# Patient Record
Sex: MALE | Race: WHITE | NOT HISPANIC OR LATINO | Employment: UNEMPLOYED | URBAN - METROPOLITAN AREA
[De-identification: names, ages, dates, MRNs, and addresses within clinical notes are randomized per-mention and may not be internally consistent; named-entity substitution may affect disease eponyms.]

---

## 2021-06-02 ENCOUNTER — RECORDS - HEALTHEAST (OUTPATIENT)
Dept: ADMINISTRATIVE | Facility: CLINIC | Age: 41
End: 2021-06-02

## 2023-05-06 ENCOUNTER — HOSPITAL ENCOUNTER (EMERGENCY)
Facility: CLINIC | Age: 43
Discharge: HOME | End: 2023-05-06
Attending: PHYSICIAN ASSISTANT | Admitting: PHYSICIAN ASSISTANT
Payer: MEDICARE

## 2023-05-06 ENCOUNTER — APPOINTMENT (OUTPATIENT)
Dept: GENERAL RADIOLOGY | Facility: CLINIC | Age: 43
End: 2023-05-06
Attending: PHYSICIAN ASSISTANT
Payer: MEDICARE

## 2023-05-06 VITALS
SYSTOLIC BLOOD PRESSURE: 126 MMHG | TEMPERATURE: 97.9 F | OXYGEN SATURATION: 99 % | HEART RATE: 100 BPM | DIASTOLIC BLOOD PRESSURE: 80 MMHG | RESPIRATION RATE: 16 BRPM

## 2023-05-06 DIAGNOSIS — D64.9 ANEMIA: ICD-10-CM

## 2023-05-06 DIAGNOSIS — R07.89 CHEST WALL PAIN: ICD-10-CM

## 2023-05-06 LAB
ANION GAP SERPL CALCULATED.3IONS-SCNC: 12 MMOL/L (ref 7–15)
BASOPHILS # BLD AUTO: 0 10E3/UL (ref 0–0.2)
BASOPHILS NFR BLD AUTO: 1 %
BUN SERPL-MCNC: 7.6 MG/DL (ref 6–20)
CALCIUM SERPL-MCNC: 8.8 MG/DL (ref 8.6–10)
CHLORIDE SERPL-SCNC: 101 MMOL/L (ref 98–107)
CREAT SERPL-MCNC: 0.86 MG/DL (ref 0.67–1.17)
D DIMER PPP FEU-MCNC: <0.27 UG/ML FEU (ref 0–0.5)
DEPRECATED HCO3 PLAS-SCNC: 24 MMOL/L (ref 22–29)
EOSINOPHIL # BLD AUTO: 0 10E3/UL (ref 0–0.7)
EOSINOPHIL NFR BLD AUTO: 0 %
ERYTHROCYTE [DISTWIDTH] IN BLOOD BY AUTOMATED COUNT: 14.1 % (ref 10–15)
GFR SERPL CREATININE-BSD FRML MDRD: >90 ML/MIN/1.73M2
GLUCOSE SERPL-MCNC: 210 MG/DL (ref 70–99)
HCT VFR BLD AUTO: 38.6 % (ref 40–53)
HGB BLD-MCNC: 12.8 G/DL (ref 13.3–17.7)
IMM GRANULOCYTES # BLD: 0 10E3/UL
IMM GRANULOCYTES NFR BLD: 0 %
LYMPHOCYTES # BLD AUTO: 1.6 10E3/UL (ref 0.8–5.3)
LYMPHOCYTES NFR BLD AUTO: 20 %
MCH RBC QN AUTO: 28.9 PG (ref 26.5–33)
MCHC RBC AUTO-ENTMCNC: 33.2 G/DL (ref 31.5–36.5)
MCV RBC AUTO: 87 FL (ref 78–100)
MONOCYTES # BLD AUTO: 0.5 10E3/UL (ref 0–1.3)
MONOCYTES NFR BLD AUTO: 7 %
NEUTROPHILS # BLD AUTO: 6 10E3/UL (ref 1.6–8.3)
NEUTROPHILS NFR BLD AUTO: 72 %
NRBC # BLD AUTO: 0 10E3/UL
NRBC BLD AUTO-RTO: 0 /100
PLATELET # BLD AUTO: 343 10E3/UL (ref 150–450)
POTASSIUM SERPL-SCNC: 4 MMOL/L (ref 3.4–5.3)
RBC # BLD AUTO: 4.43 10E6/UL (ref 4.4–5.9)
SODIUM SERPL-SCNC: 137 MMOL/L (ref 136–145)
TROPONIN T SERPL HS-MCNC: 7 NG/L
WBC # BLD AUTO: 8.2 10E3/UL (ref 4–11)

## 2023-05-06 PROCEDURE — 85014 HEMATOCRIT: CPT | Performed by: PHYSICIAN ASSISTANT

## 2023-05-06 PROCEDURE — 84484 ASSAY OF TROPONIN QUANT: CPT | Performed by: PHYSICIAN ASSISTANT

## 2023-05-06 PROCEDURE — 99285 EMERGENCY DEPT VISIT HI MDM: CPT | Mod: 25

## 2023-05-06 PROCEDURE — 85379 FIBRIN DEGRADATION QUANT: CPT | Performed by: PHYSICIAN ASSISTANT

## 2023-05-06 PROCEDURE — 71046 X-RAY EXAM CHEST 2 VIEWS: CPT

## 2023-05-06 PROCEDURE — 93005 ELECTROCARDIOGRAM TRACING: CPT

## 2023-05-06 PROCEDURE — 80048 BASIC METABOLIC PNL TOTAL CA: CPT | Performed by: PHYSICIAN ASSISTANT

## 2023-05-06 PROCEDURE — 36415 COLL VENOUS BLD VENIPUNCTURE: CPT | Performed by: PHYSICIAN ASSISTANT

## 2023-05-06 ASSESSMENT — ENCOUNTER SYMPTOMS
DIAPHORESIS: 0
VOMITING: 0
FEVER: 0

## 2023-05-06 ASSESSMENT — ACTIVITIES OF DAILY LIVING (ADL): ADLS_ACUITY_SCORE: 35

## 2023-05-06 NOTE — ED PROVIDER NOTES
History     Chief Complaint:  Chest Pain     HPI   Ricky Garcia is a 43 year old male who has a history of DM type II, schizophrenia on clozapine, and  hyperlipidemia who presents with chest pain. The patient has had 4 days of chest pain, localized to the left side of his ribs. The pain is worse with palpation, positional changes, and when he sleeps on that side. The pain does not radiate to the shoulder, jaw, or back. He denies vomiting or diaphoresis. He denies shortness of breath, leg swelling, cough, hemoptysis, syncope,or fever. The pain does not worsen with exertion. It has been intermittent and he is pain free at present.  He denies family history of premature CAD. He denies history of recent surgery or hospitalization. He denies history of DVT/PE or MI. He states that his heart rate is regularly up when he is at the doctor's office because he always gets nervous. Patient vapes no other drug use. No black or bloody stools or hematuria.    Review of External Notes: I reviewed family medicine note from 1/31 [facility] system note well-controlled dyslipidemia on atorvastatin, metformin for diabetes with A1c of 7.5.  Tachycardic at that visit as well to 117.  ROS:   Review of Systems   Constitutional: Negative for diaphoresis and fever.   Cardiovascular: Positive for chest pain. Negative for leg swelling.   Gastrointestinal: Negative for vomiting.   Musculoskeletal:        No shoulder, jaw, or back pain.   All other systems reviewed and are negative.    Allergies:  No Known Allergies     Medications:    clozapine  polyethylene glycol   Metformin  Atorvastatin    Past Medical History:    Hyperlipidemia  Psychosis  DM type II  Hypertension  Obesity  Delusional disorder  Schizophrenia     Past Surgical History:    Colonoscopy     Social History:  PCP: Diane Gómez   Physical Exam     Patient Vitals for the past 24 hrs:   BP Temp Temp src Pulse Resp SpO2   05/06/23 1245 126/80 -- -- 100 -- 99 %   05/06/23  1230 (!) 141/72 -- -- 109 -- 98 %   05/06/23 1215 122/76 -- -- 103 -- 99 %   05/06/23 1203 (!) 156/90 97.9  F (36.6  C) Oral 112 16 98 %      Physical Exam  General: Awake, alert, non-toxic.  Head:  Scalp is NC/AT  Eyes:  Conjunctiva normal, PERRL  ENT:  The external nose and ears are normal.     Oropharynx clear, uvula midline.  Neck:  Normal range of motion without rigidity.  CV:  Mildly tachycardic but regular    No pathologic murmur, rubs, or gallops.  Resp:  Breath sounds are clear bilaterally    Non-labored, no retractions or accessory muscle use  Abdomen: Abdomen is soft, no distension, no tenderness, no masses  MS:  No lower extremity edema/swelling. Extremities without joint swelling or redness.  Skin:  Warm and dry, No rash or lesions noted.  Neuro: Alert and oriented.  GCS 15 Moves all extremities normal.  No facial asymmetry. Gait normal.  Psych:  Awake. Alert. Normal affect. Appropriate interactions.    Emergency Department Course   ECG  ECG results from 05/06/23   EKG 12 lead     Value    Systolic Blood Pressure     Diastolic Blood Pressure     Ventricular Rate 116    Atrial Rate 116    NH Interval 140    QRS Duration 108        QTc 453    P Axis 57    R AXIS 51    T Axis 59    Interpretation ECG      Sinus tachycardia  Incomplete right bundle branch block  Borderline ECG  When compared with ECG of 10-MAY-2010 12:00,  No significant change was found         Imaging:  Chest XR,  PA & LAT   Final Result   IMPRESSION: Lungs are clear. No effusions or pneumothorax. Heart size is normal fold, healed left sixth lateral rib fracture. No acute osseous findings.        Report per radiology    Laboratory:  Labs Ordered and Resulted from Time of ED Arrival to Time of ED Departure   BASIC METABOLIC PANEL - Abnormal       Result Value    Sodium 137      Potassium 4.0      Chloride 101      Carbon Dioxide (CO2) 24      Anion Gap 12      Urea Nitrogen 7.6      Creatinine 0.86      Calcium 8.8      Glucose 210  (*)     GFR Estimate >90     CBC WITH PLATELETS AND DIFFERENTIAL - Abnormal    WBC Count 8.2      RBC Count 4.43      Hemoglobin 12.8 (*)     Hematocrit 38.6 (*)     MCV 87      MCH 28.9      MCHC 33.2      RDW 14.1      Platelet Count 343      % Neutrophils 72      % Lymphocytes 20      % Monocytes 7      % Eosinophils 0      % Basophils 1      % Immature Granulocytes 0      NRBCs per 100 WBC 0      Absolute Neutrophils 6.0      Absolute Lymphocytes 1.6      Absolute Monocytes 0.5      Absolute Eosinophils 0.0      Absolute Basophils 0.0      Absolute Immature Granulocytes 0.0      Absolute NRBCs 0.0     TROPONIN T, HIGH SENSITIVITY - Normal    Troponin T, High Sensitivity 7     D DIMER QUANTITATIVE - Normal    D-Dimer Quantitative <0.27        Emergency Department Course & Assessments:    Interventions:  Medications - No data to display     Independent Interpretation (X-rays, CTs, rhythm strip):  Reviewed Patient's Xray and it shows no evidence of pneumothorax consolidation or mediastinal widening.  No pleural effusion seen.    Assessments:  1230 I obtained history and examined the patient.  1359 I rechecked the patient and explained findings.    Disposition:  The patient was discharged to home.     Impression & Plan    Medical Decision Makin year old male who presents with chest pain.  Patient history and records reviewed. Broad differential considered including: ACS, PE, aortic dissection, myocarditis, pericarditis, pneumothorax, pneumonia, esophageal rupture, musculoskeletal chest wall pain, referred pain from abdomen.  Patient well appearing with non-concerning vitals.  EKG without evidence of acute ischemia or arrythmia shows mild tachycardia. HS troponin is WNL w/ongoing symptoms >6 hours, and patient is a HEART score of 3 ([id number]) and I feel ACS unlikely.  D dimer negative and low risk for PE by Well's criteria and would not pursue further workup at this time.  Chest x-ray shows no evidence of  pneumonia, pneumothorax, CHF, or mediastinal widening.  No other high risk factors present to suggest aortic dissection, and feel this is very unlikely at this time.  Abdominal examination non-tender and doubt referred pain from intra-abdominal catastrophe, pancreatitis, gallbladder pathology.  Patient was initially fairly tachycardic however this improved on recheck and per the patient this is normally how he presents for medical visits due to some anxiety.  Indeed I did review past primary care note and this was true as well.  Obviously with his clozapine use myocarditis is a diagnostic consideration however his troponin is normal symptoms are clearly reproducible and given that his tachycardia is not new and there is nothing else to suggest this I think this is extremely unlikely.  He does also have some very mild anemia which appears to be incidental.  No upper abdominal discomfort no black or bloody stools or signs of active bleeding.  Hemoglobin was normal though admittedly the most recent I can find is almost 10 years ago.  I will have him follow-up with his PCP for recheck.    Strongly suspect patient's chest pain is musculoskeletal in nature clearly reproducible.  Discussed anti-inflammatories OTC analgesics close follow-up with PCP.  Return precautions for new worsening or changing symptoms.      Diagnosis:    ICD-10-CM    1. Chest wall pain  R07.89       2. Anemia  D64.9     mild           Discharge Medications:  New Prescriptions    No medications on file      Scribe Disclosure:  [employer] Hired, am serving as a scribe at 12:29 PM on 5/6/2023 to document services personally performed by Ludwin Canela PA-C based on my observations and the provider's statements to me.    5/6/2023   Ludwin Canela PA-C Etten, Clark Ellsworth, PA-C  05/06/23 1413

## 2023-05-06 NOTE — ED TRIAGE NOTES
Pt presents to ED with intermittent chest pain since Monday. PT states that pain is worse with palpation. Pt is tachycardic, but states that his heart rate is always high at the doctor's office. Pt took ibuprofen today which helped the pain.

## 2023-05-08 LAB
ATRIAL RATE - MUSE: 116 BPM
DIASTOLIC BLOOD PRESSURE - MUSE: NORMAL MMHG
INTERPRETATION ECG - MUSE: NORMAL
P AXIS - MUSE: 57 DEGREES
PR INTERVAL - MUSE: 140 MS
QRS DURATION - MUSE: 108 MS
QT - MUSE: 326 MS
QTC - MUSE: 453 MS
R AXIS - MUSE: 51 DEGREES
SYSTOLIC BLOOD PRESSURE - MUSE: NORMAL MMHG
T AXIS - MUSE: 59 DEGREES
VENTRICULAR RATE- MUSE: 116 BPM